# Patient Record
Sex: MALE | Race: WHITE | NOT HISPANIC OR LATINO | Employment: OTHER | URBAN - METROPOLITAN AREA
[De-identification: names, ages, dates, MRNs, and addresses within clinical notes are randomized per-mention and may not be internally consistent; named-entity substitution may affect disease eponyms.]

---

## 2020-11-02 ENCOUNTER — APPOINTMENT (OUTPATIENT)
Dept: ULTRASOUND IMAGING | Facility: CLINIC | Age: 65
End: 2020-11-02
Attending: FAMILY MEDICINE
Payer: COMMERCIAL

## 2020-11-02 ENCOUNTER — APPOINTMENT (OUTPATIENT)
Dept: GENERAL RADIOLOGY | Facility: CLINIC | Age: 65
End: 2020-11-02
Attending: FAMILY MEDICINE
Payer: COMMERCIAL

## 2020-11-02 ENCOUNTER — HOSPITAL ENCOUNTER (EMERGENCY)
Facility: CLINIC | Age: 65
Discharge: HOME | End: 2020-11-02
Attending: FAMILY MEDICINE | Admitting: FAMILY MEDICINE
Payer: COMMERCIAL

## 2020-11-02 ENCOUNTER — APPOINTMENT (OUTPATIENT)
Dept: CT IMAGING | Facility: CLINIC | Age: 65
End: 2020-11-02
Attending: FAMILY MEDICINE
Payer: COMMERCIAL

## 2020-11-02 VITALS
OXYGEN SATURATION: 93 % | SYSTOLIC BLOOD PRESSURE: 132 MMHG | HEART RATE: 83 BPM | DIASTOLIC BLOOD PRESSURE: 77 MMHG | WEIGHT: 190 LBS | RESPIRATION RATE: 18 BRPM | TEMPERATURE: 98.7 F

## 2020-11-02 DIAGNOSIS — K22.89 ESOPHAGEAL THICKENING: ICD-10-CM

## 2020-11-02 DIAGNOSIS — M75.31 CALCIFIC TENDINITIS OF RIGHT SHOULDER: ICD-10-CM

## 2020-11-02 DIAGNOSIS — R60.0 EDEMA OF HAND: ICD-10-CM

## 2020-11-02 LAB
ALBUMIN SERPL-MCNC: 3.5 G/DL (ref 3.4–5)
ALP SERPL-CCNC: 113 U/L (ref 40–150)
ALT SERPL W P-5'-P-CCNC: 13 U/L (ref 0–70)
ANION GAP SERPL CALCULATED.3IONS-SCNC: 4 MMOL/L (ref 3–14)
AST SERPL W P-5'-P-CCNC: 10 U/L (ref 0–45)
BASOPHILS # BLD AUTO: 0.1 10E9/L (ref 0–0.2)
BASOPHILS NFR BLD AUTO: 0.6 %
BILIRUB SERPL-MCNC: 0.2 MG/DL (ref 0.2–1.3)
BUN SERPL-MCNC: 10 MG/DL (ref 7–30)
CALCIUM SERPL-MCNC: 9.5 MG/DL (ref 8.5–10.1)
CHLORIDE SERPL-SCNC: 104 MMOL/L (ref 94–109)
CO2 SERPL-SCNC: 33 MMOL/L (ref 20–32)
CREAT SERPL-MCNC: 0.78 MG/DL (ref 0.66–1.25)
DIFFERENTIAL METHOD BLD: NORMAL
EOSINOPHIL # BLD AUTO: 0.3 10E9/L (ref 0–0.7)
EOSINOPHIL NFR BLD AUTO: 3.4 %
ERYTHROCYTE [DISTWIDTH] IN BLOOD BY AUTOMATED COUNT: 12.9 % (ref 10–15)
GFR SERPL CREATININE-BSD FRML MDRD: >90 ML/MIN/{1.73_M2}
GLUCOSE SERPL-MCNC: 76 MG/DL (ref 70–99)
HCT VFR BLD AUTO: 43 % (ref 40–53)
HGB BLD-MCNC: 14.4 G/DL (ref 13.3–17.7)
IMM GRANULOCYTES # BLD: 0 10E9/L (ref 0–0.4)
IMM GRANULOCYTES NFR BLD: 0.2 %
LYMPHOCYTES # BLD AUTO: 1.6 10E9/L (ref 0.8–5.3)
LYMPHOCYTES NFR BLD AUTO: 18.1 %
MCH RBC QN AUTO: 30.5 PG (ref 26.5–33)
MCHC RBC AUTO-ENTMCNC: 33.5 G/DL (ref 31.5–36.5)
MCV RBC AUTO: 91 FL (ref 78–100)
MONOCYTES # BLD AUTO: 0.8 10E9/L (ref 0–1.3)
MONOCYTES NFR BLD AUTO: 8.8 %
NEUTROPHILS # BLD AUTO: 6.2 10E9/L (ref 1.6–8.3)
NEUTROPHILS NFR BLD AUTO: 68.9 %
NRBC # BLD AUTO: 0 10*3/UL
NRBC BLD AUTO-RTO: 0 /100
PLATELET # BLD AUTO: 315 10E9/L (ref 150–450)
POTASSIUM SERPL-SCNC: 3.3 MMOL/L (ref 3.4–5.3)
PROT SERPL-MCNC: 7.5 G/DL (ref 6.8–8.8)
RBC # BLD AUTO: 4.72 10E12/L (ref 4.4–5.9)
SODIUM SERPL-SCNC: 141 MMOL/L (ref 133–144)
WBC # BLD AUTO: 8.9 10E9/L (ref 4–11)

## 2020-11-02 PROCEDURE — 96374 THER/PROPH/DIAG INJ IV PUSH: CPT | Mod: 59 | Performed by: FAMILY MEDICINE

## 2020-11-02 PROCEDURE — 80053 COMPREHEN METABOLIC PANEL: CPT | Performed by: FAMILY MEDICINE

## 2020-11-02 PROCEDURE — 99285 EMERGENCY DEPT VISIT HI MDM: CPT | Mod: 25 | Performed by: FAMILY MEDICINE

## 2020-11-02 PROCEDURE — 250N000009 HC RX 250: Performed by: FAMILY MEDICINE

## 2020-11-02 PROCEDURE — 93971 EXTREMITY STUDY: CPT | Mod: RT

## 2020-11-02 PROCEDURE — 96375 TX/PRO/DX INJ NEW DRUG ADDON: CPT | Mod: 59 | Performed by: FAMILY MEDICINE

## 2020-11-02 PROCEDURE — 250N000011 HC RX IP 250 OP 636: Performed by: FAMILY MEDICINE

## 2020-11-02 PROCEDURE — 20610 DRAIN/INJ JOINT/BURSA W/O US: CPT | Performed by: FAMILY MEDICINE

## 2020-11-02 PROCEDURE — 73030 X-RAY EXAM OF SHOULDER: CPT | Mod: RT

## 2020-11-02 PROCEDURE — 71275 CT ANGIOGRAPHY CHEST: CPT

## 2020-11-02 PROCEDURE — 250N000011 HC RX IP 250 OP 636

## 2020-11-02 PROCEDURE — 85025 COMPLETE CBC W/AUTO DIFF WBC: CPT | Performed by: FAMILY MEDICINE

## 2020-11-02 PROCEDURE — 96376 TX/PRO/DX INJ SAME DRUG ADON: CPT | Mod: 59 | Performed by: FAMILY MEDICINE

## 2020-11-02 RX ORDER — ONDANSETRON 2 MG/ML
4 INJECTION INTRAMUSCULAR; INTRAVENOUS ONCE
Status: COMPLETED | OUTPATIENT
Start: 2020-11-02 | End: 2020-11-02

## 2020-11-02 RX ORDER — IOPAMIDOL 755 MG/ML
81 INJECTION, SOLUTION INTRAVASCULAR ONCE
Status: COMPLETED | OUTPATIENT
Start: 2020-11-02 | End: 2020-11-02

## 2020-11-02 RX ORDER — OXYCODONE HYDROCHLORIDE 5 MG/1
5-10 TABLET ORAL EVERY 6 HOURS PRN
Qty: 12 TABLET | Refills: 0 | Status: SHIPPED | OUTPATIENT
Start: 2020-11-02 | End: 2023-02-24

## 2020-11-02 RX ORDER — ONDANSETRON 2 MG/ML
INJECTION INTRAMUSCULAR; INTRAVENOUS
Status: COMPLETED
Start: 2020-11-02 | End: 2020-11-02

## 2020-11-02 RX ORDER — HYDROMORPHONE HYDROCHLORIDE 1 MG/ML
0.5 INJECTION, SOLUTION INTRAMUSCULAR; INTRAVENOUS; SUBCUTANEOUS
Status: DISCONTINUED | OUTPATIENT
Start: 2020-11-02 | End: 2020-11-02 | Stop reason: HOSPADM

## 2020-11-02 RX ORDER — TRIAMCINOLONE ACETONIDE 40 MG/ML
40 INJECTION, SUSPENSION INTRA-ARTICULAR; INTRAMUSCULAR ONCE
Status: COMPLETED | OUTPATIENT
Start: 2020-11-02 | End: 2020-11-02

## 2020-11-02 RX ADMIN — ONDANSETRON 4 MG: 2 INJECTION INTRAMUSCULAR; INTRAVENOUS at 11:22

## 2020-11-02 RX ADMIN — SODIUM CHLORIDE 100 ML: 9 INJECTION, SOLUTION INTRAVENOUS at 11:38

## 2020-11-02 RX ADMIN — TRIAMCINOLONE ACETONIDE 40 MG: 40 INJECTION, SUSPENSION INTRA-ARTICULAR; INTRAMUSCULAR at 14:00

## 2020-11-02 RX ADMIN — HYDROMORPHONE HYDROCHLORIDE 1 MG: 1 INJECTION, SOLUTION INTRAMUSCULAR; INTRAVENOUS; SUBCUTANEOUS at 11:23

## 2020-11-02 RX ADMIN — HYDROMORPHONE HYDROCHLORIDE 0.5 MG: 1 INJECTION, SOLUTION INTRAMUSCULAR; INTRAVENOUS; SUBCUTANEOUS at 14:54

## 2020-11-02 RX ADMIN — IOPAMIDOL 81 ML: 755 INJECTION, SOLUTION INTRAVENOUS at 11:38

## 2020-11-02 NOTE — ED AVS SNAPSHOT
[facility] Emergency Dept  [address] [facility] [address] [address]  Phone: [phone]  Fax: [phone]                                    Keaton Magaña   MRN: [id number]    Department: [facility] Emergency Dept   Date of Visit: 11/2/2020           After Visit Summary Signature Page    I have received my discharge instructions, and my questions have been answered. I have discussed any challenges I see with this plan with the nurse or doctor.    ..........................................................................................................................................  Patient/Patient Representative Signature      ..........................................................................................................................................  Patient Representative Print Name and Relationship to Patient    ..................................................               ................................................  Date                                   Time    ..........................................................................................................................................  Reviewed by Signature/Title    ...................................................              ..............................................  Date                                               Time          22EPIC Rev 08/18

## 2020-11-02 NOTE — DISCHARGE INSTRUCTIONS
Rest your shoulder is much as possible.  Use sling if needed for comfort.  Return to be seen if not improved in 48 hours or if new or worsening symptoms.  Follow-up in orthopedics or sports medicine in 1 to 2 weeks for recheck.  Use ibuprofen 400-600 mg up to 4 times per day if needed for pain. Stop if it is causing nausea or abdominal pain.   Add acetaminophen 500 mg 1-2 pills up to every 4 hours if needed for pain.   You may add oxycodone 5 mg, 1-2 tablets up to every 6 hours if needed for pain.  Try to use this primarily only at night to help with sleep.    Do not use alcohol, operate machinery, drive, or climb on ladders for 8 hours after taking oxycodone. Use docusate (100mg) 2 times a day to prevent constipation while on narcotics.  Talk with your primary care physician about scheduling upper GI endoscopy to evaluate the thickening in the esophagus.

## 2020-11-02 NOTE — ED NOTES
Pt has a cyst to top of right shoulder, pt reports cyst was lanced about 11 years ago and has not had problems with it since. Pt reports increasing pain to right side of neck, down shoulder and into bicep that started a couple days ago. Per wife report, she did attempt to drain the cyst and had a lot of white and clear drainage at site. Pt reports pain 8/10. Pt took some Advil yesterday without much relief, denies fevers at this time.

## 2020-11-02 NOTE — ED PROVIDER NOTES
History     Chief Complaint   Patient presents with     Arm Pain     Pt has swelling in lower arm but pain is in shoulder and back of neck. Pt has a cyst and pt's wife has been trying to drain the cyst and got a lot out yesterday but now arm is swelling.     JONN Magaña is a 65 year old male who presents with his wife with severe right shoulder pain.  This began about 4 days ago.  He had a cyst on his upper back that they felt was may be causing the pain.  His wife tried to drain it last night and the night before.  There was no real change in symptoms.  The pain is now severe in the area of the biceps tendon on the right.  He is unable to move his arm because of the severe pain.  In addition his wife noticed that his right hand seems swollen last evening and it more so this morning.  He does not have any recent neck injury but he has pain in the upper border of the trapezius on the right side the right shoulder and into the right arm down as far as the elbow.  This is aggravated by any attempt at movement.  He denies chest pain or shortness of breath.  He is a smoker of about a pack a day.  He has no abdominal pain.  He denies rapid or irregular heartbeat except when he gets his panic attacks due to PTSD.  He has had some chronic bowel problems that I have not recently changed.  He has chronic slow urinary stream also not recently changed.  He has not had a recent cold or flu, cough, sore throat, myalgias.  He has had no recent Covid exposure.  He is treated for coronary artery disease.  He had two-vessel bypass surgery in 97.  He has had 4 stents since then the last about 10 years ago.  His weight has been stable over the last 5 years.  His current medications are valsartan, spironolactone, carvedilol, clopidogrel, amlodipine.    Allergies:  Allergies   Allergen Reactions     Lisinopril Cough       Problem List:    There are no active problems to display for this patient.       Past Medical History:     History reviewed. No pertinent past medical history.    Past Surgical History:    History reviewed. No pertinent surgical history.    Family History:    History reviewed. No pertinent family history.    Social History:  Marital Status:   [2]  Social History     Tobacco Use     Smoking status: Current Every Day Smoker     Packs/day: 1.00     Types: Cigarettes     Smokeless tobacco: Never Used   Substance Use Topics     Alcohol use: Not Currently     Drug use: Never        Medications:         oxyCODONE (ROXICODONE) 5 MG tablet       ASPIRIN 325 MG OR TBEC       BYSTOLIC 10 MG OR TABS       DIOVAN 80 MG OR CAPS       FLOMAX CPCR 0.4 MG OR       NORVASC 10 MG OR TABS       PLAVIX 75 MG OR TABS      Review of Systems    All other systems are reviewed and are negative    Physical Exam   BP: (!) 155/75  Pulse: 89  Temp: 98.7  F (37.1  C)  Resp: 18  Weight: 86.2 kg (190 lb)  SpO2: 100 %      Physical Exam    Nursing note and vitals were reviewed.  Constitutional: Awake and alert, adequately nourished and developed appearing 65-year-old in severe discomfort, who does not appear acutely ill, and who answers questions appropriately and cooperates with examination.  HEENT: EOMI.   Neck: Freely mobile.  Cardiovascular: Cardiac examination reveals normal heart rate and regular rhythm without murmur.  Pulmonary/Chest: Breathing is unlabored.  Breath sounds are clear and equal bilaterally.  There no retractions, tachypnea, rales, wheezes, or rhonchi.  Abdomen: Soft, nontender, no HSM or masses rebound or guarding.  Musculoskeletal: Extremities are warm and well-perfused.  Radial and ulnar pulses are intact.  The right hand is swollen markedly compared to the left.  The color is normal.  There is no tenderness.  The fingertips are well perfused.  No tenderness at the subdeltoid bursa, AC joint, clavicle, scapula.   Neurological: Alert, oriented, thought content logical, coherent   Skin: Warm, dry, no rashes.  Psychiatric:  Affect congruent with acute pain      ED Course   We discussed the option of cortisone injection.  We discussed the risks of infection, bleeding, failure of the procedure, injury to tendons, arteries, nerves.  Understanding the risks, he agreed to proceed.     Arthrocentesis    Date/Time: 11/2/2020 3:02 PM  Performed by: Tima Hester MD  Authorized by: Tima Hester MD       LOCATION:   Location:  Shoulder  ANESTHESIA (see MAR for exact dosages):   Anesthesia method:  None  PROCEDURE DETAILS:    Needle gauge: 21.    Ultrasound guidance: no      Approach:  Lateral    Steroid injected: yes (40 mg triamcinolon with 2 ml 2% plain lidocaine)  Dressing:    PROCEDURE   Patient Tolerance:  Patient tolerated the procedure well with no immediate complications  Describe Procedure: A mixture of 40 mg of triamcinolone with 2 cc of 2% plain lidocaine was instilled into the right shoulder joint after a sterile prep with chlorhexidine scrub.  The patient tolerated this procedure well.                 Critical Care time:  none               Results for orders placed or performed during the hospital encounter of 11/02/20 (from the past 24 hour(s))   CBC with platelets differential   Result Value Ref Range    WBC 8.9 4.0 - 11.0 10e9/L    RBC Count 4.72 4.4 - 5.9 10e12/L    Hemoglobin 14.4 13.3 - 17.7 g/dL    Hematocrit 43.0 40.0 - 53.0 %    MCV 91 78 - 100 fl    MCH 30.5 26.5 - 33.0 pg    MCHC 33.5 31.5 - 36.5 g/dL    RDW 12.9 10.0 - 15.0 %    Platelet Count 315 150 - 450 10e9/L    Diff Method Automated Method     % Neutrophils 68.9 %    % Lymphocytes 18.1 %    % Monocytes 8.8 %    % Eosinophils 3.4 %    % Basophils 0.6 %    % Immature Granulocytes 0.2 %    Nucleated RBCs 0 0 /100    Absolute Neutrophil 6.2 1.6 - 8.3 10e9/L    Absolute Lymphocytes 1.6 0.8 - 5.3 10e9/L    Absolute Monocytes 0.8 0.0 - 1.3 10e9/L    Absolute Eosinophils 0.3 0.0 - 0.7 10e9/L    Absolute Basophils 0.1 0.0 - 0.2 10e9/L    Abs Immature Granulocytes 0.0  0 - 0.4 10e9/L    Absolute Nucleated RBC 0.0    Comprehensive metabolic panel   Result Value Ref Range    Sodium 141 133 - 144 mmol/L    Potassium 3.3 (L) 3.4 - 5.3 mmol/L    Chloride 104 94 - 109 mmol/L    Carbon Dioxide 33 (H) 20 - 32 mmol/L    Anion Gap 4 3 - 14 mmol/L    Glucose 76 70 - 99 mg/dL    Urea Nitrogen 10 7 - 30 mg/dL    Creatinine 0.78 0.66 - 1.25 mg/dL    GFR Estimate >90 >60 mL/min/[1.73_m2]    GFR Estimate If Black >90 >60 mL/min/[1.73_m2]    Calcium 9.5 8.5 - 10.1 mg/dL    Bilirubin Total 0.2 0.2 - 1.3 mg/dL    Albumin 3.5 3.4 - 5.0 g/dL    Protein Total 7.5 6.8 - 8.8 g/dL    Alkaline Phosphatase 113 40 - 150 U/L    ALT 13 0 - 70 U/L    AST 10 0 - 45 U/L   CT Chest Pulmonary Embolism w Contrast    Narrative    CT CHEST PULMONARY EMBOLISM WITH CONTRAST  11/2/2020 11:49 AM    CLINICAL HISTORY:  Chest pain. Right upper extremity edema.    TECHNIQUE: CT angiogram chest during arterial phase injection IV  contrast. 2D and 3D MIP reconstructions were performed by the CT  technologist. Dose reduction techniques were used.     CONTRAST: 81 mL Isovue 370    COMPARISON: None.    FINDINGS:  ANGIOGRAM CHEST: Pulmonary arteries are normal caliber and negative  for pulmonary emboli. Thoracic aorta is negative for dissection. No CT  evidence of right heart strain.    LUNGS AND PLEURA: Probable right major fissure lymph node on series 5,  image 61. Lungs are otherwise clear without infiltrate or  consolidation. Atelectasis or scarring inferior lingula. No pleural  effusions.    MEDIASTINUM/AXILLAE: Heart is normal in size. Extensive coronary  artery calcification is present. Patient is status post CABG. Thyroid  gland appears normal in size where imaged. Aorta demonstrates  calcified plaque without aneurysm or dissection. Mild circumferential  esophageal wall thickening is noted in the mid and distal third of the  intrathoracic esophagus, possibly esophagitis in the correct clinical  setting. No enlarged lymph  nodes. Calcified prevascular lymph node is  present.    UPPER ABDOMEN: Cholelithiasis. Gallbladder is otherwise decompressed.  Calcified granulomas noted in the spleen.    MUSCULOSKELETAL: Degenerative thoracic spine changes are noted.      Impression    IMPRESSION:  1.  No evidence of pulmonary embolism. Thoracic aorta is unremarkable.  Lungs are grossly clear. No infiltrate or consolidation.    2.  Extensive calcified atherosclerotic plaque coronary arteries and  thoracic aorta. No aortic aneurysm or dissection. Prior CABG.    3.  Cholelithiasis and evidence of old granulomatous disease.    NICOLE BLANCO MD   Shoulder XR, 2 view, right    Narrative    Examination:  XR SHOULDER 2 VIEW RIGHT    Date:  11/2/2020 12:02 PM     Clinical Information: left shoulder pain, suspect acute calcific  tendinitis     Additional Information: none    Comparison: none      Impression    Impression:    1.  Fracture deformity of the right clavicular shaft. This may be  chronic, as there is suggestion of some bridging bone, but the  superior margin of the fracture appears sharply marginated. Recommend  correlation with clavicular pain and tenderness, versus history of an  old injury.  2.  Widened AC joint space, sequela of prior distal clavicular  excision or AC joint injury.  3.  Tiny, 6 mm ossific density along the posterior aspect of the  greater tuberosity on the internal rotation view could represent a  small focus of calcific tendinopathy.  4.  Remainder of the right shoulder is negative.    ARUN MARTINEZ MD   US Upper Extremity Venous Duplex Right    Narrative    ULTRASOUND UPPER EXTREMITY VENOUS DUPLEX RIGHT  11/2/2020 12:41 PM     HISTORY: Right arm edema.    COMPARISON: None.    TECHNIQUE: Color Doppler and spectral waveform analysis obtained  throughout the deep and superficial veins of the right upper  extremity.    FINDINGS: The right internal jugular, visualized subclavian, axillary,  and brachial veins demonstrate  normal blood flow, compression (where  applicable), and augmentation. Basilic and cephalic veins are patent.  Radial and ulnar veins are compressible.      Impression    IMPRESSION: Negative for deep venous thrombosis in the right upper  extremity.       Medications   HYDROmorphone (PF) (DILAUDID) injection 0.5 mg (0.5 mg Intravenous Given 11/2/20 1454)   HYDROmorphone (DILAUDID) injection 1 mg (1 mg Intravenous Given 11/2/20 1123)   iopamidol (ISOVUE-370) solution 81 mL (81 mLs Intravenous Given 11/2/20 1138)   sodium chloride 0.9 % bag 500mL for CT scan flush use (100 mLs Intravenous Given 11/2/20 1138)   ondansetron (ZOFRAN) injection 4 mg (4 mg Intravenous Given 11/2/20 1122)   triamcinolone (KENALOG-40) injection 40 mg (40 mg INTRA-ARTICULAR Given by Other 11/2/20 1400)       Assessments & Plan (with Medical Decision Making)     65-year-old male presented with severe right shoulder pain and edema of the right hand as described above.  His symptom history was suggestive of acute calcific tendinitis at the edema was atypical.  Ultrasound of the right upper extremity did not show a DVT.  The hand was warm and well-perfused.  The radial and ulnar pulses were intact.  No masses were palpable.  CT scan of the chest did not show SVC obstruction, mediastinal mass, or other cause for his symptoms.  There was some thickening of the esophagus of uncertain significance but undoubtedly unrelated to the acute presentation.  He should have upper GI endoscopy to further does this.  I discussed this with he and his wife.    X-ray of the shoulder showed a little calcification consistent with calcific tendinitis.  I discussed options with him including conservative care physical therapy, and steroid injection.  After discussion, we proceeded with steroid injection as above.  He obtained significant improvement from the local anesthetic component and was able to sleep.  He will be discharged with instructions to limit activity and  take acetaminophen, ibuprofen, and narcotics only as a last resort.  He should follow-up in sports medicine in 1 to 2 weeks.  He should return if he has new or worsening symptoms.    I have reviewed the nursing notes.    I have reviewed the findings, diagnosis, plan and need for follow up with the patient.       New Prescriptions    OXYCODONE (ROXICODONE) 5 MG TABLET    Take 1-2 tablets (5-10 mg) by mouth every 6 hours as needed for pain       Final diagnoses:   Calcific tendinitis of right shoulder   Edema of hand   Esophageal thickening       11/2/2020   [facility] EMERGENCY DEPT     Tima Hester MD  11/02/20 [id number]

## 2020-11-08 ENCOUNTER — HOSPITAL ENCOUNTER (EMERGENCY)
Facility: CLINIC | Age: 65
Discharge: LEFT BEFORE DISCHARGE | End: 2020-11-08
Attending: EMERGENCY MEDICINE

## 2020-11-08 VITALS
HEART RATE: 85 BPM | RESPIRATION RATE: 16 BRPM | HEIGHT: 70 IN | WEIGHT: 190 LBS | OXYGEN SATURATION: 95 % | BODY MASS INDEX: 27.2 KG/M2 | TEMPERATURE: 97.1 F | DIASTOLIC BLOOD PRESSURE: 93 MMHG | SYSTOLIC BLOOD PRESSURE: 185 MMHG

## 2020-11-08 ASSESSMENT — MIFFLIN-ST. JEOR: SCORE: 1643.08

## 2020-11-08 NOTE — ED NOTES
"Attempted to room patient via wheelchair while family member left to move his vehicle. Pt stated he wanted to leave and starting pointing back towards the lobby door. Pt stated, \"you are not going to put in IV's and draw blood.\" Pt stated declined to stay for evaluation and to be seen by a MD. Pt instructed to return at any time for any reason should he have concerns or develop worsening or new symptoms. Pt walked out, moving all extremities after signing a declination for treatment.  "

## 2020-11-08 NOTE — ED TRIAGE NOTES
"Pt here with his brother for evaluation of altered mental status. Called his brother ~45 minutes ago with plan to meet up at the [employer] in [place]. Brother concerned about slurred speech and disorientation during this phone call. Upon patient's brother arriving at the gas station, officers on scene concerned about altered mental status and stroke like symptoms.    Pt denying any neurologic concerns upon arriving in the department. No slurred speech on arrival or memory impairment. He is alert and oriented x 4. Agitated with being in ED raising voice at writer and making insulting statements including \"lady I have socks older than you.\" Refusing medical care on arrival for almost 10 minutes. Finally agreed to medical screening in the ED here at [facility]. Denies alcohol or illicit drug use although writer noted ETOH odor from patient's breath. No prior stroke history. Heart history including CABG in the 80s. Ultimately agreed to be seen in ED for minimum of medical screening exam.  "

## 2023-02-24 ENCOUNTER — HOSPITAL ENCOUNTER (EMERGENCY)
Facility: CLINIC | Age: 68
Discharge: HOME | End: 2023-02-24
Attending: EMERGENCY MEDICINE | Admitting: EMERGENCY MEDICINE
Payer: COMMERCIAL

## 2023-02-24 ENCOUNTER — APPOINTMENT (OUTPATIENT)
Dept: GENERAL RADIOLOGY | Facility: CLINIC | Age: 68
End: 2023-02-24
Attending: EMERGENCY MEDICINE
Payer: COMMERCIAL

## 2023-02-24 VITALS
RESPIRATION RATE: 18 BRPM | HEART RATE: 55 BPM | SYSTOLIC BLOOD PRESSURE: 119 MMHG | BODY MASS INDEX: 28 KG/M2 | WEIGHT: 200 LBS | DIASTOLIC BLOOD PRESSURE: 72 MMHG | TEMPERATURE: 98.2 F | HEIGHT: 71 IN | OXYGEN SATURATION: 98 %

## 2023-02-24 DIAGNOSIS — M25.552 HIP PAIN, LEFT: ICD-10-CM

## 2023-02-24 PROCEDURE — 73502 X-RAY EXAM HIP UNI 2-3 VIEWS: CPT

## 2023-02-24 PROCEDURE — 99283 EMERGENCY DEPT VISIT LOW MDM: CPT | Performed by: EMERGENCY MEDICINE

## 2023-02-24 PROCEDURE — 250N000013 HC RX MED GY IP 250 OP 250 PS 637: Performed by: EMERGENCY MEDICINE

## 2023-02-24 RX ORDER — OXYCODONE HYDROCHLORIDE 5 MG/1
5 TABLET ORAL EVERY 6 HOURS PRN
Qty: 6 TABLET | Refills: 0 | Status: SHIPPED | OUTPATIENT
Start: 2023-02-24 | End: 2023-06-06

## 2023-02-24 RX ORDER — OXYCODONE HYDROCHLORIDE 5 MG/1
5 TABLET ORAL ONCE
Status: COMPLETED | OUTPATIENT
Start: 2023-02-24 | End: 2023-02-24

## 2023-02-24 RX ADMIN — OXYCODONE HYDROCHLORIDE 5 MG: 5 TABLET ORAL at 14:00

## 2023-02-24 ASSESSMENT — ACTIVITIES OF DAILY LIVING (ADL): ADLS_ACUITY_SCORE: 35

## 2023-02-24 NOTE — ED NOTES
Per patient report tripped over service dog about 1 week ago, landed on L. Hip, pain has increased in severity, denies N/T, unable to sleep at night r/t discomfort.

## 2023-02-24 NOTE — ED TRIAGE NOTES
Pt here after falling over his service dog over a week ago. C/o left hip pain. Pt has been able to bear weight and walk. Laying down causes a lot of pain, has been unable to sleep.      Triage Assessment     Row Name 02/24/23 4824       Triage Assessment (Adult)    Airway WDL WDL       Respiratory WDL    Respiratory WDL WDL       Skin Circulation/Temperature WDL    Skin Circulation/Temperature WDL WDL       Cardiac WDL    Cardiac WDL WDL       Peripheral/Neurovascular WDL    Peripheral Neurovascular WDL WDL       Cognitive/Neuro/Behavioral WDL    Cognitive/Neuro/Behavioral WDL WDL

## 2023-02-24 NOTE — DISCHARGE INSTRUCTIONS
Use ice for 10 to 15 minutes at a time every 1-2 hours while awake to help with the pain.  Keep walking and moving.  Return if you are having worsening pain, fevers, rash, or other concerns.  Otherwise follow-up in clinic for recheck if not feeling better over the next week.    Use acetaminophen for your symptoms.  Use oxycodone as needed for pain that is not controlled by the prior two medications.    Oxycodone is an addictive opioid medication, please only take it when the pain is more than can be controlled by acetaminophen or ibuprofen alone. It will also make you lightheaded, nauseated, and constipated.  Do not drive, operate heavy machinery, or take care of young children while taking this medication. Do not mix it with other medications or drugs that will make you sleepy, such as alcohol.     Repeated studies have shown that the longer you use opioid pain medications, the longer it is until you return to normal function. It is our recommendation that you taper off opioids as quickly as possible with the goal of returning to normal function or near normal function. Long term use of opioids quickly results in growing tolerance to the medication (less of the benefits) and increased dependence (more of the bad side effects).     Pain is very difficult to treat and we can very rarely take away pain completely. If you are having difficulty with pain over several weeks after an injury, you may need to start different medications and therapies (such as physical therapy, graded exercise, massage, and acupuncture). Please talk about this with your regular doctor.     If you are interested in seeking free, confidential treatment referral and information service for individuals and families facing mental health and/or substance use disorders please call [phone] ([phone])

## 2023-04-13 ENCOUNTER — HOSPITAL ENCOUNTER (EMERGENCY)
Facility: CLINIC | Age: 68
Discharge: HOME | End: 2023-04-13
Payer: COMMERCIAL

## 2023-04-13 VITALS
DIASTOLIC BLOOD PRESSURE: 79 MMHG | HEART RATE: 72 BPM | OXYGEN SATURATION: 96 % | TEMPERATURE: 97.1 F | RESPIRATION RATE: 16 BRPM | SYSTOLIC BLOOD PRESSURE: 148 MMHG

## 2023-04-14 NOTE — ED TRIAGE NOTES
Fell about 6 weeks ago, was told he had a deep muscle injury. Seemed to be getting worse but now worse

## 2023-04-14 NOTE — ED TRIAGE NOTES
Pt get very aggressive and agitated when he was told that there were 10 people ahead of him to be seen in the ER and that we had no open beds for him. Pt shouted that he was leaving to find another place that would take care of him. Wife wheeled him out to his care. Security in vicinity if needed

## 2023-05-19 ENCOUNTER — TRANSCRIBE ORDERS (OUTPATIENT)
Dept: OTHER | Age: 68
End: 2023-05-19

## 2023-05-19 DIAGNOSIS — I20.0 UNSTABLE ANGINA (H): Primary | ICD-10-CM

## 2023-06-06 ENCOUNTER — HOSPITAL ENCOUNTER (EMERGENCY)
Facility: CLINIC | Age: 68
Discharge: HOME | End: 2023-06-06
Attending: FAMILY MEDICINE | Admitting: FAMILY MEDICINE
Payer: COMMERCIAL

## 2023-06-06 ENCOUNTER — APPOINTMENT (OUTPATIENT)
Dept: GENERAL RADIOLOGY | Facility: CLINIC | Age: 68
End: 2023-06-06
Attending: FAMILY MEDICINE
Payer: COMMERCIAL

## 2023-06-06 VITALS
RESPIRATION RATE: 18 BRPM | OXYGEN SATURATION: 97 % | WEIGHT: 178 LBS | BODY MASS INDEX: 24.83 KG/M2 | DIASTOLIC BLOOD PRESSURE: 79 MMHG | SYSTOLIC BLOOD PRESSURE: 155 MMHG

## 2023-06-06 DIAGNOSIS — L03.031 CELLULITIS OF TOE OF RIGHT FOOT: ICD-10-CM

## 2023-06-06 PROCEDURE — 250N000013 HC RX MED GY IP 250 OP 250 PS 637: Performed by: FAMILY MEDICINE

## 2023-06-06 PROCEDURE — 99284 EMERGENCY DEPT VISIT MOD MDM: CPT | Performed by: FAMILY MEDICINE

## 2023-06-06 PROCEDURE — 73630 X-RAY EXAM OF FOOT: CPT | Mod: RT

## 2023-06-06 RX ORDER — OXYCODONE HYDROCHLORIDE 5 MG/1
5-10 TABLET ORAL EVERY 8 HOURS PRN
Qty: 12 TABLET | Refills: 0 | Status: SHIPPED | OUTPATIENT
Start: 2023-06-06 | End: 2023-06-09

## 2023-06-06 RX ORDER — CEPHALEXIN 500 MG/1
500 CAPSULE ORAL 4 TIMES DAILY
Qty: 28 CAPSULE | Refills: 0 | Status: SHIPPED | OUTPATIENT
Start: 2023-06-06 | End: 2023-06-13

## 2023-06-06 RX ORDER — OXYCODONE HYDROCHLORIDE 5 MG/1
10 TABLET ORAL ONCE
Status: COMPLETED | OUTPATIENT
Start: 2023-06-06 | End: 2023-06-06

## 2023-06-06 RX ORDER — SULFAMETHOXAZOLE/TRIMETHOPRIM 800-160 MG
1 TABLET ORAL 2 TIMES DAILY
Qty: 14 TABLET | Refills: 0 | Status: SHIPPED | OUTPATIENT
Start: 2023-06-06 | End: 2023-06-13

## 2023-06-06 RX ADMIN — OXYCODONE HYDROCHLORIDE 10 MG: 5 TABLET ORAL at 08:46

## 2023-06-06 ASSESSMENT — ACTIVITIES OF DAILY LIVING (ADL)
ADLS_ACUITY_SCORE: 35

## 2023-06-06 NOTE — DISCHARGE INSTRUCTIONS
RETURN TO THE EMERGENCY ROOM FOR THE FOLLOWING:    Severely worsened pain, severely worsened swelling/redness/tenderness, fever greater than 101, confusion or concerning changes in behavior, vomiting and dehydration, or at anytime for any concern.    FOLLOW UP:    With your orthopedist on 6/8, as scheduled.    TREATMENT RECOMMENDATIONS:    Keflex 4 times a day for 7 days.    Bactrim twice daily for 7 days.    Ibuprofen 600 mg every six hours for pain (7 days duration).  Tylenol 1000 mg every six hours for pain (7 days duration).  Therefore, you can alternate these every three hours and do it safely.    Oxycodone for pain control as needed.    NURSE ADVICE LINE:  [phone] or [phone]

## 2023-06-06 NOTE — ED TRIAGE NOTES
Pt had surgery on 6/1 for displaced open fx of distal phalanx right great toe. Per wife at the surgical site there is redness.     Triage Assessment     Row Name 06/06/23 0621       Triage Assessment (Adult)    Airway WDL WDL       Respiratory WDL    Respiratory WDL WDL       Skin Circulation/Temperature WDL    Skin Circulation/Temperature WDL X  redness at that right foot- surgical incision on great toe.       Cardiac WDL    Cardiac WDL WDL       Peripheral/Neurovascular WDL    Peripheral Neurovascular WDL WDL       Cognitive/Neuro/Behavioral WDL    Cognitive/Neuro/Behavioral WDL WDL

## 2023-09-27 ENCOUNTER — HOSPITAL ENCOUNTER (EMERGENCY)
Facility: CLINIC | Age: 68
Discharge: HOME | End: 2023-09-27
Attending: FAMILY MEDICINE | Admitting: EMERGENCY MEDICINE
Payer: COMMERCIAL

## 2023-09-27 ENCOUNTER — APPOINTMENT (OUTPATIENT)
Dept: GENERAL RADIOLOGY | Facility: CLINIC | Age: 68
End: 2023-09-27
Attending: EMERGENCY MEDICINE
Payer: COMMERCIAL

## 2023-09-27 ENCOUNTER — APPOINTMENT (OUTPATIENT)
Dept: CT IMAGING | Facility: CLINIC | Age: 68
End: 2023-09-27
Attending: EMERGENCY MEDICINE
Payer: COMMERCIAL

## 2023-09-27 VITALS
OXYGEN SATURATION: 98 % | DIASTOLIC BLOOD PRESSURE: 76 MMHG | BODY MASS INDEX: 25.1 KG/M2 | SYSTOLIC BLOOD PRESSURE: 172 MMHG | HEART RATE: 72 BPM | TEMPERATURE: 98.1 F | WEIGHT: 180 LBS | RESPIRATION RATE: 18 BRPM
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
## 2023-09-27 DIAGNOSIS — I10 PRIMARY HYPERTENSION: ICD-10-CM

## 2023-09-27 DIAGNOSIS — S01.81XA FACIAL LACERATION, INITIAL ENCOUNTER: ICD-10-CM

## 2023-09-27 DIAGNOSIS — M79.641 PAIN OF RIGHT HAND: ICD-10-CM

## 2023-09-27 PROCEDURE — 99284 EMERGENCY DEPT VISIT MOD MDM: CPT | Mod: 25 | Performed by: EMERGENCY MEDICINE

## 2023-09-27 PROCEDURE — 12011 RPR F/E/E/N/L/M 2.5 CM/<: CPT | Performed by: EMERGENCY MEDICINE

## 2023-09-27 PROCEDURE — 73130 X-RAY EXAM OF HAND: CPT | Mod: RT

## 2023-09-27 PROCEDURE — 90715 TDAP VACCINE 7 YRS/> IM: CPT | Performed by: EMERGENCY MEDICINE

## 2023-09-27 PROCEDURE — 72125 CT NECK SPINE W/O DYE: CPT

## 2023-09-27 PROCEDURE — 250N000011 HC RX IP 250 OP 636: Performed by: EMERGENCY MEDICINE

## 2023-09-27 PROCEDURE — 70450 CT HEAD/BRAIN W/O DYE: CPT

## 2023-09-27 PROCEDURE — 90471 IMMUNIZATION ADMIN: CPT | Performed by: EMERGENCY MEDICINE

## 2023-09-27 RX ADMIN — CLOSTRIDIUM TETANI TOXOID ANTIGEN (FORMALDEHYDE INACTIVATED), CORYNEBACTERIUM DIPHTHERIAE TOXOID ANTIGEN (FORMALDEHYDE INACTIVATED), BORDETELLA PERTUSSIS TOXOID ANTIGEN (GLUTARALDEHYDE INACTIVATED), BORDETELLA PERTUSSIS FILAMENTOUS HEMAGGLUTININ ANTIGEN (FORMALDEHYDE INACTIVATED), BORDETELLA PERTUSSIS PERTACTIN ANTIGEN, AND BORDETELLA PERTUSSIS FIMBRIAE 2/3 ANTIGEN 0.5 ML: 5; 2; 2.5; 5; 3; 5 INJECTION, SUSPENSION INTRAMUSCULAR at 21:15

## 2023-09-27 ASSESSMENT — ACTIVITIES OF DAILY LIVING (ADL): ADLS_ACUITY_SCORE: 35

## 2023-09-28 NOTE — DISCHARGE INSTRUCTIONS
CT scan of your head and neck did not show an acute fracture or bleeding in your head.     XR of right hand also did not show a fracture. If you continue to have pain after 1 week, follow-up in clinic and consider repeat imaging.    Please keep the wound clean and dry for 24-48 hours. The affected area should be kept elevated as much as possible.  After 24 hours, the dressing may be removed and the wound may be gently cleaned with warm water and soap.  You may apply petroleum based ointment as desired.  You should return in 5-7 days to your primary care physician or the emergency department for suture removal.    Any time the skin is damaged, scar formation is unavoidable. You can minimize the scar by following the above instructions and preventing infection. The scar will continue to evolve for at least 1 year. Final appearance of the scar will not be known until at least that time. Please apply sun screen to the scar before any sun exposure for the first year as sunburn or even tanning may cause the scar to become discolored and lead to poor cosmetic outcomes. If after 1 year, you are unhappy with the appearance of the scar you should seek follow-up with the appropriate health care professional to discuss further options.    You should return to the emergency department or your primary care physician immediately if you develop warmth, redness, swelling, drainage from the wound, or have fevers.

## 2023-09-28 NOTE — ED TRIAGE NOTES
Pt presents with fall to face 1 hour ago to concrete. Pt with noted laceration to left eyebrow. Pt with complaints of pain to head and right hand. Pt states he is on blood thinner. Med list showing Asprin and Plavix. Pt denies LOC. Pt denies dizziness or vision changes.      Triage Assessment       Row Name 09/27/23 9088       Triage Assessment (Adult)    Airway WDL WDL       Respiratory WDL    Respiratory WDL WDL       Skin Circulation/Temperature WDL    Skin Circulation/Temperature WDL WDL       Cardiac WDL    Cardiac WDL WDL       Peripheral/Neurovascular WDL    Peripheral Neurovascular WDL WDL       Cognitive/Neuro/Behavioral WDL    Cognitive/Neuro/Behavioral WDL WDL

## 2023-09-28 NOTE — ED PROVIDER NOTES
History     Chief Complaint   Patient presents with    Head Laceration     HPI  Keaton Magaña is a 68 year old male with history of coronary artery disease status post PCI and aspirin Plavix who had fall approximately 4 foot off a porch onto concrete with active bleeding from his head and pain in his right hand.  Given that he is on Plavix and had his head injury trauma eval was requested by nursing staff.  Patient tells me that he was running from be an incidentally fell off the porch.  No loss of consciousness.  Pain in his left forehead.  Cannot control bleeding.  Also has trouble closing his right hand.  Otherwise feels okay.  No neck or back pain.  Was in his usual state of health prior to injury.  Typically gets his care at the [facility] in [place].  Says he does get anxious in hospital settings.  Additional information provided by spouse who is at bedside and supportive.        The patient's PMHx, Surgical Hx, Allergies, and Medications were all reviewed with the patient.    Allergies:  Allergies   Allergen Reactions    Lisinopril Cough       Problem List:    There are no problems to display for this patient.       Past Medical History:    No past medical history on file.    Past Surgical History:    No past surgical history on file.    Family History:    No family history on file.    Social History:  Marital Status:   [2]  Social History     Tobacco Use    Smoking status: Every Day     Packs/day: 1.00     Types: Cigarettes    Smokeless tobacco: Never   Substance Use Topics    Alcohol use: Not Currently    Drug use: Never        Medications:    ASPIRIN 325 MG OR TBEC  BYSTOLIC 10 MG OR TABS  DIOVAN 80 MG OR CAPS  FLOMAX CPCR 0.4 MG OR  NORVASC 10 MG OR TABS  PLAVIX 75 MG OR TABS          Review of Systems  Pertinent positives and negatives mentioned in HPI    Physical Exam   BP: (!) 195/88  Pulse: 73  Temp: 98.1  F (36.7  C)  Resp: 18  Weight: 81.6 kg (180 lb)  SpO2: 97  %    Physical Exam  GEN: Appears acutely distressed  HENT: Nonpulsatile active bleeding over left eye.  Edema and ecchymosis around left eye.  No hemotympanum or otorrhea.  No retroauricular ecchymosis or tenderness.  No facial tenderness.  No malocclusion of teeth.    EYES: EOM intact. Conjunctiva clear. No discharge.  No RAPD  NECK: Symmetric, freely mobile.  No posterior tenderness or discomfort with lateral rotation  CV : Regular rate and rhythm.  PULM: Normal effort. No wheezes, rales, or rhonchi bilaterally.  ABD: Soft, non-tender, non-distended. No rebound or guarding.   BACK: no midline tenderness.   NEURO: Normal speech. Following commands. CN II-XII grossly intact. Answering questions and interacting appropriately.   EXT: mid right hand pain with decreased  strength. Max tenderness area of mid 3rd metacarpal.   INT: Warm. No diaphoresis.     ED [facility]    -Laceration Repair    Date/Time: 10/2/2023 3:16 PM    Performed by: Marcio Haynes MD  Authorized by: Marcio Haynes MD    Risks, benefits and alternatives discussed.      ANESTHESIA (see MAR for exact dosages):     Anesthesia method:  Local infiltration    Local anesthetic:  Bupivacaine 0.25% WITH epi  LACERATION DETAILS     Location:  Face    Face location:  L eyebrow    Length (cm):  2    REPAIR TYPE:     Repair type:  Simple    EXPLORATION:     Hemostasis achieved with:  Direct pressure    Wound exploration: entire depth of wound probed and visualized      Wound extent: no foreign body      Contaminated: no      TREATMENT:     Area cleansed with:  Saline    Irrigation volume:  250    SKIN REPAIR     Repair method:  Sutures    Suture size:  5-0    Suture material:  Nylon    Suture technique:  Simple interrupted    Number of sutures:  6    APPROXIMATION     Approximation:  Close    POST-PROCEDURE DETAILS     Dressing:  Antibiotic ointment      PROCEDURE    Patient Tolerance:  Patient tolerated  the procedure well with no immediate complications                   Critical Care time:  none               Results for orders placed or performed during the hospital encounter of 09/27/23 (from the past 24 hour(s))   XR Hand Right G/E 3 Views    Narrative    EXAM: XR HAND RIGHT G/E 3 VIEWS  LOCATION: [facility]  DATE: 9/27/2023    INDICATION: right hand pain in setting of trauma, tenderness mid hand and difficulty with   COMPARISON: None.      Impression    IMPRESSION: No acute fracture or malalignment. There is moderate first CMC joint degenerative changes. Bone spur at the second metacarpal neck.   Head CT w/o contrast    Narrative    EXAM: CT HEAD W/O CONTRAST, CT CERVICAL SPINE W/O CONTRAST  LOCATION: [facility]  DATE/TIME: 9/27/2023 8:17 PM CDT    INDICATION: head injury on plavix, bleeing over left eye  COMPARISON: None.  TECHNIQUE:  1) Routine CT Head without IV contrast. Multiplanar reformats. Dose reduction techniques were used.  2) Routine CT Cervical Spine without IV contrast. Multiplanar reformats. Dose reduction techniques were used.    FINDINGS:    HEAD CT:  INTRACRANIAL CONTENTS: No acute transcortical infarct. Chronic left frontal opercular infarct with gliosis and volume loss. No hemorrhage or extraaxial collection. No mass effect. Subarachnoid cisterns are patent. Scattered white matter hypodensities   are, while nonspecific, most compatible with chronic microvascular ischemic changes. Proportional prominence of the ventricles and sulci is compatible with diffuse cerebral volume loss.    VISUALIZED ORBITS/SINUSES/MASTOIDS: No visible intraorbital abnormality. Paranasal sinuses are clear. No middle ear or mastoid effusion.    BONES/SOFT TISSUES: No acute abnormality. Partially visualized left periorbital soft tissue contusion with swelling extending to the superjacent left frontal scalp. No visible acute displaced fracture. Vascular  calcifications indicate atherosclerosis,   densely involving the left vertebral artery V3 and V4 segments.    CERVICAL SPINE CT:  VERTEBRA: Spine straightening may be positional and/or related to muscle spasm. No displaced fracture or vertebral body compression. The dens, atlantoaxial joint, and facets are intact.    CANAL/FORAMINA: Multilevel discogenic, uncovertebral, and facet degenerative changes. Mild retrodental pannus formation narrows the ventral CSF space at C1. Severe disc space narrowing at C5-C6 with disc osteophyte complex contributing to moderate spinal   canal stenosis and severe left and moderate-severe right foraminal stenosis. Additional moderate-severe foraminal stenosis at C3-C4 and C4-C5.    PARASPINAL: No visible soft tissue abnormality. Lung apices are clear. Vascular calcifications indicate atherosclerosis.      Impression    IMPRESSION:  HEAD CT:  1.  No acute traumatic intracranial abnormality.   2.  Left periorbital and frontal scalp soft tissue injuries without visible acute displaced fracture. Note, incomplete visualization of the infraorbital soft tissue contusion component.  3.  Chronic left frontal lobe infarct.    CERVICAL SPINE CT:  1.  No acute osseous abnormality of the cervical spine.   2.  Advanced multilevel degenerative changes as detailed.       Cervical spine CT w/o contrast    Narrative    EXAM: CT HEAD W/O CONTRAST, CT CERVICAL SPINE W/O CONTRAST  LOCATION: [facility] [place]  DATE/TIME: 9/27/2023 8:17 PM CDT    INDICATION: head injury on plavix, bleeing over left eye  COMPARISON: None.  TECHNIQUE:  1) Routine CT Head without IV contrast. Multiplanar reformats. Dose reduction techniques were used.  2) Routine CT Cervical Spine without IV contrast. Multiplanar reformats. Dose reduction techniques were used.    FINDINGS:    HEAD CT:  INTRACRANIAL CONTENTS: No acute transcortical infarct. Chronic left frontal opercular infarct with gliosis and volume loss.  No hemorrhage or extraaxial collection. No mass effect. Subarachnoid cisterns are patent. Scattered white matter hypodensities   are, while nonspecific, most compatible with chronic microvascular ischemic changes. Proportional prominence of the ventricles and sulci is compatible with diffuse cerebral volume loss.    VISUALIZED ORBITS/SINUSES/MASTOIDS: No visible intraorbital abnormality. Paranasal sinuses are clear. No middle ear or mastoid effusion.    BONES/SOFT TISSUES: No acute abnormality. Partially visualized left periorbital soft tissue contusion with swelling extending to the superjacent left frontal scalp. No visible acute displaced fracture. Vascular calcifications indicate atherosclerosis,   densely involving the left vertebral artery V3 and V4 segments.    CERVICAL SPINE CT:  VERTEBRA: Spine straightening may be positional and/or related to muscle spasm. No displaced fracture or vertebral body compression. The dens, atlantoaxial joint, and facets are intact.    CANAL/FORAMINA: Multilevel discogenic, uncovertebral, and facet degenerative changes. Mild retrodental pannus formation narrows the ventral CSF space at C1. Severe disc space narrowing at C5-C6 with disc osteophyte complex contributing to moderate spinal   canal stenosis and severe left and moderate-severe right foraminal stenosis. Additional moderate-severe foraminal stenosis at C3-C4 and C4-C5.    PARASPINAL: No visible soft tissue abnormality. Lung apices are clear. Vascular calcifications indicate atherosclerosis.      Impression    IMPRESSION:  HEAD CT:  1.  No acute traumatic intracranial abnormality.   2.  Left periorbital and frontal scalp soft tissue injuries without visible acute displaced fracture. Note, incomplete visualization of the infraorbital soft tissue contusion component.  3.  Chronic left frontal lobe infarct.    CERVICAL SPINE CT:  1.  No acute osseous abnormality of the cervical spine.   2.  Advanced multilevel degenerative  changes as detailed.           Medications   Tdap (tetanus-diphtheria-acell pertussis) (ADACEL) injection 0.5 mL (0.5 mLs Intramuscular $Given 9/27/23 2115)       Assessments & Plan (with Medical Decision Making)   68 year old male with past medical history notable for coronary disease status post PCI on aspirin and Plavix, tobacco use, hypertension, with head injury with accidental fall off porch approximately 4 feet onto concrete and also with pain in his right hand.  Given that he is on clopidogrel and his head injury trauma evaluation was requested by nursing staff and patient was evaluated immediately.  On initial examination, he is awake and alert and has nonpulsatile bleeding coming from cut over his left eye.  The significant amount of clot in his eyebrow making it difficult to completely visualize the extent of the laceration.  Quarter percent bupivacaine with epinephrine injected for anesthesia and to help control bleeding.  4 x 4 placed over wound and controlling bleeding.  Patient has pain in his mid hand over the third metacarpal midshaft.  Has pain with .  CT scan of head without contrast, CT spine cervical and plain films of hand ordered.     CT of head without acute intercranial pathology.  CT cervical spine without acute bony abnormality or traumatic subluxation.  Plain film of right hand without acute bony abnormality.    Laceration repaired primarily.  I cannot see where he has had recent tetanus immunization and tetanus was updated.  [facility].  Unable to see the record and discussed options and he was agreeable to having tetanus updated.  Will need sutures removed in 5 to 7 days.  We discussed pain management given that he is on dual antiplatelets recommend against NSAIDs.  Tylenol for pain control.  Follow-up and ED return precautions discussed.  Discharged in improved condition.              I have reviewed the nursing  notes.         New Prescriptions    No medications on file       Final diagnoses:   Fall, initial encounter   Facial laceration, initial encounter   Pain of right hand   Primary hypertension     Marcio Haynes MD        9/27/2023   [facility] EMERGENCY DEPT    Disclaimer: This note consists of words and symbols derived from keyboarding and dictation using voice recognition software.  As a result, there may be errors that have gone undetected.  Please consider this when interpreting information found in this note.               Marcio Haynes MD  09/27/23 4520       Marcio Haynes MD  10/02/23 6674

## 2025-01-31 NOTE — ED PROVIDER NOTES
"  History     Chief Complaint   Patient presents with     Fall     HPI  Keaton Magaña is a 67 year old male who presents for left hip pain.  Started about 1 week ago when the patient tripped over his dog and fell, landing on his hip.  He did not hit his head or have other injuries.  Denies headache, neck pain, chest pain, shortness of breath, abdominal pain pain nausea vomiting.  He has still been able to ambulate but it hurts, hurts most when he is laying on his left side making it difficult to sleep.    Allergies:  Allergies   Allergen Reactions     Lisinopril Cough       Problem List:    There are no problems to display for this patient.       Past Medical History:    No past medical history on file.    Past Surgical History:    No past surgical history on file.    Family History:    No family history on file.    Social History:  Marital Status:   [2]  Social History     Tobacco Use     Smoking status: Every Day     Packs/day: 1.00     Types: Cigarettes     Smokeless tobacco: Never   Substance Use Topics     Alcohol use: Not Currently     Drug use: Never        Medications:    oxyCODONE (ROXICODONE) 5 MG tablet  ASPIRIN 325 MG OR TBEC  BYSTOLIC 10 MG OR TABS  DIOVAN 80 MG OR CAPS  FLOMAX CPCR 0.4 MG OR  NORVASC 10 MG OR TABS  PLAVIX 75 MG OR TABS          Review of Systems    Physical Exam   BP: 119/72  Pulse: 55  Temp: 98.2  F (36.8  C)  Resp: 18  Height: 180.3 cm (5' 11\")  Weight: 90.7 kg (200 lb)  SpO2: 98 %      Physical Exam  Constitutional:       General: He is not in acute distress.     Appearance: He is well-developed. He is not diaphoretic.   HENT:      Head: Normocephalic and atraumatic.   Eyes:      General: No scleral icterus.  Chest:      Chest wall: No tenderness.   Abdominal:      Tenderness: There is no abdominal tenderness.   Musculoskeletal:      Cervical back: Normal range of motion and neck supple.      Comments: Left Hip: no deformity, erythema, or warmth appreciated; no tenderness " Order for carotid duplex ordered.    Spoke to pt wife and informed her order was placed.    While speaking with Tequila, she said that pt has been off work since the incident happened. His employer is requesting that pt get [facility] paperwork completed. Tequila wanted to know if Dr. Abbott would be willing to fill that out for pt.     Tequila said that the Ophthalmologist told them there wasn't much change in pt vision at the appointment yesterday.       over greater trochanter or inguinal region; full ROM including internal and external rotation; normal strength for flexion and extension.   Left Knee: no deformity, effusion, erythema or warmth appreciated; no tenderness over patella, joint line, or femoral condyles; full ROM   Skin:     General: Skin is warm and dry.      Findings: No rash.   Neurological:      Mental Status: He is alert and oriented to person, place, and time.         ED Course                 Procedures              Critical Care time:  none               Results for orders placed or performed during the hospital encounter of 02/24/23 (from the past 24 hour(s))   Pelvis XR w/ unilateral hip left    Narrative    PELVIS AND HIP LEFT ONE VIEW  DATE/TIME: 2/24/2023 2:46 PM     INDICATION: Left hip pain after a fall.   COMPARISON: None.      Impression    IMPRESSION:  1.  Normal left hip joint spacing and alignment.  2.  No fracture.  3.  Atherosclerotic calcification.    DANYA LOPEZ MD         SYSTEM ID:  [id number]       Medications   oxyCODONE (ROXICODONE) tablet 5 mg (5 mg Oral $Given 2/24/23 1400)       Assessments & Plan (with Medical Decision Making)   67-year-old male presents for left hip pain.  No signs of cellulitis, abscess, or septic arthritis on exam.  He is given oxycodone for the pain. X-ray of the hip obtained, images reviewed independently as well as radiology read reviewed, no signs of fracture or dislocation.  He is feeling better if the oxycodone is safe to discharge with instructions to use acetaminophen, ice, and is given a prescription for short course of oxycodone to help with the symptoms.  He is told to return if worse, otherwise get rechecked if not better in the next week.  The patient is in agreement with this plan.    I have reviewed the nursing notes.    I have reviewed the findings, diagnosis, plan and need for follow up with the patient.           New Prescriptions    OXYCODONE (ROXICODONE) 5 MG TABLET    Take 1  tablet (5 mg) by mouth every 6 hours as needed for severe pain (7-10)       Final diagnoses:   Hip pain, left       2/24/2023   [facility] EMERGENCY DEPT     Leonard Green MD  02/24/23 5961